# Patient Record
Sex: FEMALE | Race: WHITE | NOT HISPANIC OR LATINO | URBAN - NONMETROPOLITAN AREA
[De-identification: names, ages, dates, MRNs, and addresses within clinical notes are randomized per-mention and may not be internally consistent; named-entity substitution may affect disease eponyms.]

---

## 2022-02-03 ENCOUNTER — OFFICE VISIT (OUTPATIENT)
Dept: OTOLARYNGOLOGY | Facility: OTHER | Age: 22
End: 2022-02-03
Attending: OTOLARYNGOLOGY
Payer: COMMERCIAL

## 2022-02-03 VITALS
OXYGEN SATURATION: 98 % | BODY MASS INDEX: 20.76 KG/M2 | WEIGHT: 137 LBS | HEIGHT: 68 IN | DIASTOLIC BLOOD PRESSURE: 62 MMHG | TEMPERATURE: 99.2 F | SYSTOLIC BLOOD PRESSURE: 112 MMHG | HEART RATE: 77 BPM

## 2022-02-03 DIAGNOSIS — J38.7 IRRITABLE LARYNX SYNDROME: Primary | ICD-10-CM

## 2022-02-03 DIAGNOSIS — R09.A2 GLOBUS PHARYNGEUS: ICD-10-CM

## 2022-02-03 PROCEDURE — 92504 EAR MICROSCOPY EXAMINATION: CPT | Performed by: OTOLARYNGOLOGY

## 2022-02-03 PROCEDURE — 99203 OFFICE O/P NEW LOW 30 MIN: CPT | Mod: 25 | Performed by: OTOLARYNGOLOGY

## 2022-02-03 RX ORDER — ESOMEPRAZOLE MAGNESIUM 40 MG/1
40 CAPSULE, DELAYED RELEASE ORAL
Qty: 30 CAPSULE | Refills: 3 | Status: SHIPPED | OUTPATIENT
Start: 2022-02-03

## 2022-02-03 ASSESSMENT — MIFFLIN-ST. JEOR: SCORE: 1424.93

## 2022-02-03 ASSESSMENT — PAIN SCALES - GENERAL: PAINLEVEL: NO PAIN (0)

## 2022-02-03 NOTE — PROGRESS NOTES
"Otolaryngology Consultation    Patient: Adry Maurer  : 2000    Patient presents with:  Consult: Globus Sensation; Self Referral      HPI:  Adry Maurer is a 21 year old female seen today for Globus pharyngeus    She notes a sensatin of a lump in the back of her mid throat x 2-3 months    assocaited chronic throat clearing x 6 years    She denies dysphagia dysphonia weight loss or otalgia  She denies heartburn    She denies chronic rhinitis congestion or bothersome allergic rhinitis  She denies chronic postnasal drainage    1 or 2 cups coffee in the am    Never tobacco user  adequate water intake  No excess caffeine or etoh    Accompanied by her  who notes excessive throat clearing    Recently moved from [place], [place]    No current outpatient medications on file.       Allergies: Patient has no known allergies.     No past medical history on file.    No past surgical history on file.    ENT family history reviewed    Social History     Tobacco Use     Smoking status: Never Smoker     Smokeless tobacco: Never Used   Substance Use Topics     Alcohol use: None     Drug use: None       Review of Systems  ROS: 10 point ROS neg other than the symptoms noted above in the HPI     Physical Exam  /62 (BP Location: Right arm, Patient Position: Sitting, Cuff Size: Adult Regular)   Pulse 77   Temp 99.2  F (37.3  C) (Tympanic)   Ht 1.727 m (5' 8\")   Wt 62.1 kg (137 lb)   SpO2 98%   BMI 20.83 kg/m    General - The patient is well nourished and well developed, and appears to have good nutritional status.  Alert and oriented to person and place, answers questions and cooperates with examination appropriately.  Habitual throat clearing throughout the exam  Head and Face - Normocephalic and atraumatic, with no gross asymmetry noted.  The facial nerve is intact, with strong symmetric movements.  Voice and Breathing - The patient was breathing comfortably without the use of accessory muscles. There " was no wheezing, stridor, or stertor.  The patients voice was clear and strong, and had appropriate pitch and quality.  No enedelia peripheral digital clubbing or cyanosis   Ears -The external auditory canals are patent, the tympanic membranes are intact without effusion, retraction or mass.  Bony landmarks are intact.  Eyes - Extraocular movements intact, and the pupils were reactive to light.  Sclera were not icteric or injected, conjunctiva were pink and moist.  Mouth - Examination of the oral cavity showed pink, healthy oral mucosa. No lesions or ulcerations noted.  The tongue was mobile and midline, and the dentition were in good condition.    Throat - The walls of the oropharynx were smooth, pink, moist, symmetric, and had no lesions or ulcerations.  The tonsillar pillars and soft palate were symmetric.  The uvula was midline on elevation.   Grade 3 symmetric tonsils.  Palpation tongue base is smooth and symmetric   neck - No palpable enlarged fixed cervical lymph nodes.  No neck cysts or unusual tenderness to palpation.   No palpable fixed thyroid nodules or concerning goiter.  The trachea is grossly midline.   Nose - External contour is symmetric, no gross deflection or scars.  Nasal mucosa is pink and moist with no abnormal mucus.  The septum and turbinates were evaluated.  No polyps, masses, or purulence noted on examination.    Attempts at mirror laryngoscopy were not possible due to gag reflex.  Therefore I proceeded with a fiberoptic examination after informed consent.  First I applied topical nasal lidocaine and neosynephrine.  I then passed the scope through the nasal cavity.     The nasopharynx was mucosally covered and symmetric.  The eustachian tube openings were unobstructed.  Going further down I had a clear view of the base of tongue which had normal appearing grade 2 non-erythematous symmetric lingual tonsillar tissue.  The base of tongue was free of lesions, and the vallecula was open.  The  epiglottis was smooth and mucosally covered.  The supraglottic larynx was then clearly visualized.  The vocal cords moved smoothly and symmetrically and were without mass or nodules.  Vocal cord mobility was normal bilaterally with phonation and respiration..  The pyriform sinuses were open and without enedelia mass or pooling of secretions upon valsalva, and the limited view of the postcricoid region did not show any lesions.  The patient tolerated the procedure well.        Impression and Plan- Adry Maurer is a 21 year old female with:    ICD-10-CM    1. Irritable larynx syndrome  J38.7 XR Esophagram     CT Soft Tissue Neck w Contrast     esomeprazole (NEXIUM) 40 MG DR capsule   2. Globus pharyngeus  R19.8        Complete Barium Swallow and CT Neck  When she feels the urge to throat clear perform a hard swallow or home.  Distraction techniques were reinforced    Reassured no endolaryngeal mass or erythema    Start Nexium    Call us within 1-2 months with no improvement and we will schedule a superior laryngeal nerve block (start left)  and consider starting Elavil and ST    Immediate injection today and or immediate start of Elavil was discussed she would like to try the Nexium and behavior modifications first.    Adult lifestyle changes to prevent ILS (irritable larynx syndrome ) reviewed      Avoid eating and drinking within two to three hours prior to bedtime    Do not drink alcohol    Eat small meals and slowly    Limit problem foods:    o Caffeine  o Carbonated drinks  o Chocolate  o Peppermint  o Tomato  o Citrus fruits  o Fatty and fried foods      Lose weight    Quit smoking    Wear loose clothing          Yeimy Bruno D.O.  Otolaryngology/Head and Neck Surgery  Allergy

## 2022-02-03 NOTE — LETTER
"    2/3/2022         RE: Adry Maurer  [address] [facility] [address]        Dear Colleague,    Thank you for referring your patient, Adry Maurer, to the [facility]. Please see a copy of my visit note below.    Otolaryngology Consultation    Patient: Adry Maurer  : 2000    Patient presents with:  Consult: Globus Sensation; Self Referral      HPI:  Adry Maurer is a 21 year old female seen today for Globus pharyngeus    She notes a sensatin of a lump in the back of her mid throat x 2-3 months    assocaited chronic throat clearing x 6 years    She denies dysphagia dysphonia weight loss or otalgia  She denies heartburn    She denies chronic rhinitis congestion or bothersome allergic rhinitis  She denies chronic postnasal drainage    1 or 2 cups coffee in the am    Never tobacco user  adequate water intake  No excess caffeine or etoh    Accompanied by her  who notes excessive throat clearing    Recently moved from [place], [place]    No current outpatient medications on file.       Allergies: Patient has no known allergies.     No past medical history on file.    No past surgical history on file.    ENT family history reviewed    Social History     Tobacco Use     Smoking status: Never Smoker     Smokeless tobacco: Never Used   Substance Use Topics     Alcohol use: None     Drug use: None       Review of Systems  ROS: 10 point ROS neg other than the symptoms noted above in the HPI     Physical Exam  /62 (BP Location: Right arm, Patient Position: Sitting, Cuff Size: Adult Regular)   Pulse 77   Temp 99.2  F (37.3  C) (Tympanic)   Ht 1.727 m (5' 8\")   Wt 62.1 kg (137 lb)   SpO2 98%   BMI 20.83 kg/m    General - The patient is well nourished and well developed, and appears to have good nutritional status.  Alert and oriented to person and place, answers questions and cooperates with examination appropriately.  Habitual throat clearing " throughout the exam  Head and Face - Normocephalic and atraumatic, with no gross asymmetry noted.  The facial nerve is intact, with strong symmetric movements.  Voice and Breathing - The patient was breathing comfortably without the use of accessory muscles. There was no wheezing, stridor, or stertor.  The patients voice was clear and strong, and had appropriate pitch and quality.  No enedelia peripheral digital clubbing or cyanosis   Ears -The external auditory canals are patent, the tympanic membranes are intact without effusion, retraction or mass.  Bony landmarks are intact.  Eyes - Extraocular movements intact, and the pupils were reactive to light.  Sclera were not icteric or injected, conjunctiva were pink and moist.  Mouth - Examination of the oral cavity showed pink, healthy oral mucosa. No lesions or ulcerations noted.  The tongue was mobile and midline, and the dentition were in good condition.    Throat - The walls of the oropharynx were smooth, pink, moist, symmetric, and had no lesions or ulcerations.  The tonsillar pillars and soft palate were symmetric.  The uvula was midline on elevation.   Grade 3 symmetric tonsils.  Palpation tongue base is smooth and symmetric   neck - No palpable enlarged fixed cervical lymph nodes.  No neck cysts or unusual tenderness to palpation.   No palpable fixed thyroid nodules or concerning goiter.  The trachea is grossly midline.   Nose - External contour is symmetric, no gross deflection or scars.  Nasal mucosa is pink and moist with no abnormal mucus.  The septum and turbinates were evaluated.  No polyps, masses, or purulence noted on examination.    Attempts at mirror laryngoscopy were not possible due to gag reflex.  Therefore I proceeded with a fiberoptic examination after informed consent.  First I applied topical nasal lidocaine and neosynephrine.  I then passed the scope through the nasal cavity.     The nasopharynx was mucosally covered and symmetric.  The  eustachian tube openings were unobstructed.  Going further down I had a clear view of the base of tongue which had normal appearing grade 2 non-erythematous symmetric lingual tonsillar tissue.  The base of tongue was free of lesions, and the vallecula was open.  The epiglottis was smooth and mucosally covered.  The supraglottic larynx was then clearly visualized.  The vocal cords moved smoothly and symmetrically and were without mass or nodules.  Vocal cord mobility was normal bilaterally with phonation and respiration..  The pyriform sinuses were open and without enedelia mass or pooling of secretions upon valsalva, and the limited view of the postcricoid region did not show any lesions.  The patient tolerated the procedure well.        Impression and Plan- Adry Maurer is a 21 year old female with:    ICD-10-CM    1. Irritable larynx syndrome  J38.7 XR Esophagram     CT Soft Tissue Neck w Contrast     esomeprazole (NEXIUM) 40 MG DR capsule   2. Globus pharyngeus  R19.8        Complete Barium Swallow and CT Neck  When she feels the urge to throat clear perform a hard swallow or home.  Distraction techniques were reinforced    Reassured no endolaryngeal mass or erythema    Start Nexium    Call us within 1-2 months with no improvement and we will schedule a superior laryngeal nerve block (start left)  and consider starting Elavil and ST    Immediate injection today and or immediate start of Elavil was discussed she would like to try the Nexium and behavior modifications first.    Adult lifestyle changes to prevent ILS (irritable larynx syndrome ) reviewed      Avoid eating and drinking within two to three hours prior to bedtime    Do not drink alcohol    Eat small meals and slowly    Limit problem foods:    o Caffeine  o Carbonated drinks  o Chocolate  o Peppermint  o Tomato  o Citrus fruits  o Fatty and fried foods      Lose weight    Quit smoking    Wear loose clothing          Yeimy Bruno,  [email or web address].  Otolaryngology/Head and Neck Surgery  Allergy        Again, thank you for allowing me to participate in the care of your patient.        Sincerely,        Yeimy Bruno MD

## 2022-02-03 NOTE — PATIENT INSTRUCTIONS
Thank you for allowing Dr. Bruno and our ENT team to participate in your care.  If your medications are too expensive, please give the nurse a call.  We can possibly change this medication.  If you have a scheduling or an appointment question please contact our Health Unit Coordinator at their direct line [phone].   ALL nursing questions or concerns can be directed to your ENT nurse at: [phone] - [place]    Complete Barium Swallow and CT Neck    Start Nexium    Call us within 1-2 months with no improvement and we will schedule a superior laryngeal nerve block and consider starting Elavil        Adult lifestyle changes to prevent LPR reviewed      Avoid eating and drinking within two to three hours prior to bedtime    Do not drink alcohol    Eat small meals and slowly    Limit problem foods:    o Caffeine  o Carbonated drinks  o Chocolate  o Peppermint  o Tomato  o Citrus fruits  o Fatty and fried foods      Lose weight    Quit smoking    Wear loose clothing

## 2022-02-03 NOTE — NURSING NOTE
"Chief Complaint   Patient presents with     Consult     Globus Sensation; Self Referral       Initial /62 (BP Location: Right arm, Patient Position: Sitting, Cuff Size: Adult Regular)   Pulse 77   Temp 99.2  F (37.3  C) (Tympanic)   Ht 1.727 m (5' 8\")   Wt 62.1 kg (137 lb)   SpO2 98%   BMI 20.83 kg/m   Estimated body mass index is 20.83 kg/m  as calculated from the following:    Height as of this encounter: 1.727 m (5' 8\").    Weight as of this encounter: 62.1 kg (137 lb).  Medication Reconciliation: complete  Arlene Calvert LPN    "